# Patient Record
Sex: FEMALE | Race: BLACK OR AFRICAN AMERICAN | Employment: UNEMPLOYED | URBAN - METROPOLITAN AREA
[De-identification: names, ages, dates, MRNs, and addresses within clinical notes are randomized per-mention and may not be internally consistent; named-entity substitution may affect disease eponyms.]

---

## 2017-02-15 ENCOUNTER — HOSPITAL ENCOUNTER (EMERGENCY)
Age: 42
Discharge: HOME | End: 2017-02-15
Attending: EMERGENCY MEDICINE
Payer: MEDICAID

## 2017-02-15 VITALS
TEMPERATURE: 98.2 F | SYSTOLIC BLOOD PRESSURE: 127 MMHG | RESPIRATION RATE: 16 BRPM | HEIGHT: 61 IN | DIASTOLIC BLOOD PRESSURE: 78 MMHG | BODY MASS INDEX: 50.98 KG/M2 | WEIGHT: 270 LBS | OXYGEN SATURATION: 99 % | HEART RATE: 101 BPM

## 2017-02-15 PROCEDURE — 99282 EMERGENCY DEPT VISIT SF MDM: CPT

## 2017-02-15 NOTE — ED PROVIDER NOTES
HPI Comments: 5:41 PM Kaylen Alonso is a 37 y.o. female who presents to the ED for evaluation after her finger was stuck with a used needle 1 day ago. The pt states she was cleaning her friend's 80year old diabetic grandmother's house when she  Then down and felt a particular fingerwas a needle. The needle had been used. The patient who was the needle has no history of hep C hepatitis or HIV. Patient called poison control who advised no further treatment but she became anxious last night so she came in for further evaluation. No other aggravating or alleviating factors. No other associated symptoms. Patient is a 37 y.o. female presenting with body fluid exposure. The history is provided by the patient. Body fluid exposure          Past Medical History:   Diagnosis Date    Anemia     Asthma      sarcoidosis    G6PD deficiency (HCC)     Menses, irregular     Migraine     Thyroid disease        Past Surgical History:   Procedure Laterality Date    Hx gyn       c section; btl    Hx appendectomy      Hx other surgical       lung biopsy         Family History:   Problem Relation Age of Onset    Hypertension Mother     Diabetes Mother     Cancer Father     Hypertension Father     Diabetes Father        Social History     Social History    Marital status: SINGLE     Spouse name: N/A    Number of children: N/A    Years of education: N/A     Occupational History    Not on file.      Social History Main Topics    Smoking status: Current Every Day Smoker     Packs/day: 0.25     Years: 20.00    Smokeless tobacco: Former User    Alcohol use 0.6 oz/week     1 Shots of liquor per week      Comment: occasional/social    Drug use: No    Sexual activity: Not on file     Other Topics Concern    Not on file     Social History Narrative         ALLERGIES: Ciprofloxacin; Haldol [haloperidol lactate]; and Percocet [oxycodone-acetaminophen]    Review of Systems   All other systems reviewed and are negative. Vitals:    02/15/17 1517   BP: 127/78   Pulse: (!) 101   Resp: 16   Temp: 98.2 °F (36.8 °C)   SpO2: 99%   Weight: 122.5 kg (270 lb)   Height: 5' 1\" (1.549 m)            Physical Exam   Constitutional: She is oriented to person, place, and time. She appears well-developed. HENT:   Head: Normocephalic and atraumatic. Eyes: EOM are normal. Pupils are equal, round, and reactive to light. Neck: Normal range of motion. Neck supple. Cardiovascular: Normal rate, regular rhythm and normal heart sounds. Exam reveals no friction rub. No murmur heard. Pulmonary/Chest: Effort normal and breath sounds normal. No respiratory distress. She has no wheezes. Abdominal: Soft. She exhibits no distension. There is no tenderness. There is no rebound and no guarding. Musculoskeletal: Normal range of motion. Neurological: She is alert and oriented to person, place, and time. Skin: Skin is warm and dry. Psychiatric: She has a normal mood and affect. Her behavior is normal. Thought content normal.        MDM  Number of Diagnoses or Management Options  Diagnosis management comments:  Low if not no risk needle stick. Patient is known.,  Advised just to confirm she has no history of hepatitis or HIV. This started been confirmed by the patient at the time of the stick. ED Course       Procedures    Scribe Attestation:   SERA LOUIS am scribing for and in the presence of Que Moreira MD on this day 02/15/17 at 5:41 PM   jozef Coley    Signed by: Jozef Coley. 5:41 PM    Provider Attestation:  I personally performed the services described in the documentation, reviewed the documentation, as recorded by the scribe in my presence, and it accurately and completely records my words and actions.   Que Moreira MD. 5:41 PM

## 2017-10-04 ENCOUNTER — HOSPITAL ENCOUNTER (OUTPATIENT)
Dept: MAMMOGRAPHY | Age: 42
Discharge: HOME | End: 2017-10-04
Attending: OBSTETRICS & GYNECOLOGY
Payer: MEDICAID

## 2017-10-04 DIAGNOSIS — Z12.31 VISIT FOR SCREENING MAMMOGRAM: ICD-10-CM

## 2017-10-04 PROCEDURE — 77067 SCR MAMMO BI INCL CAD: CPT

## 2018-10-17 ENCOUNTER — HOSPITAL ENCOUNTER (OUTPATIENT)
Dept: MAMMOGRAPHY | Age: 43
Discharge: HOME | End: 2018-10-17
Attending: FAMILY MEDICINE
Payer: MEDICAID

## 2018-10-17 DIAGNOSIS — Z12.31 VISIT FOR SCREENING MAMMOGRAM: ICD-10-CM

## 2018-10-17 PROCEDURE — 77067 SCR MAMMO BI INCL CAD: CPT

## 2019-11-05 ENCOUNTER — HOSPITAL ENCOUNTER (OUTPATIENT)
Dept: MAMMOGRAPHY | Age: 44
Discharge: HOME | End: 2019-11-05
Attending: FAMILY MEDICINE
Payer: MEDICAID

## 2019-11-05 DIAGNOSIS — Z12.31 VISIT FOR SCREENING MAMMOGRAM: ICD-10-CM

## 2019-11-05 PROCEDURE — 77063 BREAST TOMOSYNTHESIS BI: CPT

## 2020-06-19 ENCOUNTER — HOSPITAL ENCOUNTER (OUTPATIENT)
Dept: PHYSICAL THERAPY | Age: 45
Discharge: HOME | End: 2020-06-19
Payer: MEDICAID

## 2020-06-19 PROCEDURE — 97161 PT EVAL LOW COMPLEX 20 MIN: CPT

## 2020-06-19 NOTE — PROGRESS NOTES
PT DAILY TREATMENT NOTE 10-18    Patient Name: Jennifer Heath  Date:2020  : 1975  [x]  Patient  Verified  Payor: BLUE CROSS MEDICAID / Plan: [facility] / Product Type: Managed Care Medicaid /    In time:11:00  Out time:11:40  Total Treatment Time (min): 40  Visit #: 1 of 10    Medicare/BCBS Only   Total Timed Codes (min):  15 1:1 Treatment Time:  40       Treatment Area: MCL sprain of right knee [S83.411A]    SUBJECTIVE  Pain Level (0-10 scale): 7  Any medication changes, allergies to medications, adverse drug reactions, diagnosis change, or new procedure performed?: [x] No    [] Yes (see summary sheet for update)  Subjective functional status/changes:   [] No changes reported  See POC    OBJECTIVE    30 min [x]Eval                  []Re-Eval       5 min Therapeutic Exercise:  [] See flow sheet :   Rationale: increase ROM and increase strength to improve the patients ability to perform gait with improved toe clearance. 10 min Therapeutic Activity:  []  See flow sheet : Patient education on therapy assessment, prognosis, expectations for therapy sessions, patient goals, advice on restricting weight bearing during stair negotiation, and HEP. Rationale: to improve the patients ability to adhere to HEP and therapy sessions for increased compliance when working toward therapy goals.           With   [] TE   [x] TA   [] neuro   [] other: Patient Education: [x] Review HEP    [] Progressed/Changed HEP based on:   [] positioning   [] body mechanics   [] transfers   [] heat/ice application    [] other:      Other Objective/Functional Measures: See POC     Pain Level (0-10 scale) post treatment: 7    ASSESSMENT/Changes in Function: See POC    Patient will continue to benefit from skilled PT services to modify and progress therapeutic interventions, address functional mobility deficits, address ROM deficits, address strength deficits, analyze and address soft tissue restrictions, analyze and cue movement patterns, analyze and modify body mechanics/ergonomics, assess and modify postural abnormalities, address imbalance/dizziness and instruct in home and community integration to attain remaining goals.      [x]  See Plan of Care  []  See progress note/recertification  []  See Discharge Summary         Progress towards goals / Updated goals:  See POC    PLAN  [x]  Upgrade activities as tolerated     []  Continue plan of care  [x]  Update interventions per flow sheet       []  Discharge due to:_  []  Other:_      Tereza Quesada 6/19/2020  10:51 AM    Future Appointments   Date Time Provider Kathie Hoclomb   6/19/2020 11:00 AM Oni Gonzalez [facility]
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
## 2020-07-06 ENCOUNTER — HOSPITAL ENCOUNTER (OUTPATIENT)
Dept: PHYSICAL THERAPY | Age: 45
Discharge: HOME | End: 2020-07-06
Payer: MEDICAID

## 2020-07-06 PROCEDURE — 97110 THERAPEUTIC EXERCISES: CPT

## 2020-07-06 PROCEDURE — 97014 ELECTRIC STIMULATION THERAPY: CPT

## 2020-07-06 PROCEDURE — 97530 THERAPEUTIC ACTIVITIES: CPT

## 2020-07-06 NOTE — PROGRESS NOTES
PT DAILY TREATMENT NOTE 10-18    Patient Name: Radha Zapata  Date:2020  : 1975  [x]  Patient  Verified  Payor: BLUE CROSS MEDICAID / Plan: [facility] [employer] [facility] [employer] / Product Type: Managed Care Medicaid /    In time:2:50  Out time:3:43  Total Treatment Time (min): 48  Visit #: 2 of 10    Medicare/BCBS Only   Total Timed Codes (min):  38 1:1 Treatment Time:  38       Treatment Area: Right knee pain [M25.561]  Sprain of medial collateral ligament of right knee, initial encounter [S83.411A]    SUBJECTIVE  Pain Level (0-10 scale): 4  Any medication changes, allergies to medications, adverse drug reactions, diagnosis change, or new procedure performed?: [x] No    [] Yes (see summary sheet for update)  Subjective functional status/changes:   [] No changes reported  Pt reports her pain is not too bad today but she continues to have pain with stairs and instability at times.     OBJECTIVE    Modality rationale: decrease edema, decrease inflammation and decrease pain to improve the patients ability to tolerate ADLs   Min Type Additional Details   15 (includes 5 min set up) [x] Estim:  [x]Unatt       []IFC  [x]Premod                        []Other:  []w/ice   [x]w/heat  Position: reclined  Location: anterior/medial and anterior/lateral right knee     [] Estim: []Att    []TENS instruct  []NMES                    []Other:  []w/US   []w/ice   []w/heat  Position:  Location:    []  Traction: [] Cervical       []Lumbar                       [] Prone          []Supine                       []Intermittent   []Continuous Lbs:  [] before manual  [] after manual    []  Ultrasound: []Continuous   [] Pulsed                           []1MHz   []3MHz W/cm2:  Location:    []  Iontophoresis with dexamethasone         Location: [] Take home patch   [] In clinic    []  Ice     []  heat  []  Ice massage  []  Laser   []  Anodyne Position:  Location:    []  Laser with stim  []  Other:  Position:  Location:    [] Vasopneumatic Device Pressure:       [] lo [] med [] hi   Temperature: [] lo [] med [] hi   [] Skin assessment post-treatment:  []intact []redness- no adverse reaction    []redness  adverse reaction:     28 min Therapeutic Exercise:  [x] See flow sheet :   Rationale: increase ROM and increase strength to improve the patients ability to tolerate ADLs    10 min Therapeutic Activity:  [x]  See flow sheet : sit to stand and balancing exercises to improve ease of transfers and balance with household mobility    Rationale: increase ROM and increase strength  to improve the patients ability to transfers and mobility. With   [] TE   [] TA   [] neuro   [] other: Patient Education: [x] Review HEP    [] Progressed/Changed HEP based on:   [] positioning   [] body mechanics   [] transfers   [] heat/ice application    [] other:      Other Objective/Functional Measures: Initiated exercises/interventions in flow sheet. Increased Q angle noted on the right LE in supine and sitting. No UE support used for sit to stands. Unable to flex her right knee without externally rotating her right hip secondary to knee pain. Needed UE support with MSR balance exercise today. Pain Level (0-10 scale) post treatment: 3    ASSESSMENT/Changes in Function: Reported improvement in pain post session today after premod and CP. Pt reported increased pain in the right hip/knee with standing exercises and was unable to perform MSR with the right LE behind without bending her right knee. Pt was limited today by pain in the right knee and hip. We will plan on continuing therapy to improve pain control and improve mobility. Continue POC as tolerated.      Patient will continue to benefit from skilled PT services to modify and progress therapeutic interventions, address functional mobility deficits, address ROM deficits, address strength deficits, analyze and address soft tissue restrictions, analyze and cue movement patterns, analyze and modify body mechanics/ergonomics, assess and modify postural abnormalities, address imbalance/dizziness and instruct in home and community integration to attain remaining goals. []  See Plan of Care  []  See progress note/recertification  []  See Discharge Summary         Progress towards goals / Updated goals:  Short Term Goals: To be accomplished in 1 week  - Goal: Pt to be compliant with initial HEP to improve patient's ability to independently manage symptoms. Status at last note/certification: Established and reviewed with Pt  Long Term Goals: To be accomplished in 10 treatments  - Goal: Pt to demonstrate single leg stance abilities of at least 10 seconds bilaterally to improve her ability to negotiate obstacles in the community with decreased falls risk. Status at last note/certification: 2 seconds right, 6 seconds left  - Goal: Patient will demonstrate 0-110 degrees AROM right knee to increase ease of ADLs  Status at last note/certification: right knee 0-104 degrees AROM  - Goal: Patient will ascend and descend 24 steps with reciprocal pattern to increase ease of entry into home. Status at last note/certification: 24 steps with left LE leading and bearing all weight during stair negotiation  - Goal: Patient will perform 5x STS test in less than or equal to 12 seconds to demo improved functional LE strength. Status at last note/certification: 16 seconds  - Goal: Patient will increase FOTO score to at least 64 pts to demonstrate increased functional mobility.   Status at last note/certification: FOTO 52 pts     PLAN  [x]  Upgrade activities as tolerated     [x]  Continue plan of care  [x]  Update interventions per flow sheet       []  Discharge due to:_  []  Other:_      Nany Warren, PT 7/6/2020  3:46 PM    Future Appointments   Date Time Provider Kathie Holcomb   7/9/2020  2:45 PM Marek Thakkar [facility]   7/15/2020  2:45 PM Irish Abernathy, PT [id number]   7/17/2020  2:45 PM Erica White R [id number]   7/20/2020  2:45 PM Lelan Flight, PT [id number] [facility]   7/23/2020  2:45 PM Lelan Flight, PT [id number] [facility]   7/27/2020  2:45 PM Lelan Flight, PT [id number] [facility]   7/30/2020  2:45 PM Skeet Sonia Salomon, PT [id number]

## 2020-07-09 ENCOUNTER — HOSPITAL ENCOUNTER (OUTPATIENT)
Dept: PHYSICAL THERAPY | Age: 45
Discharge: HOME | End: 2020-07-09
Payer: MEDICAID

## 2020-07-09 PROCEDURE — 97110 THERAPEUTIC EXERCISES: CPT

## 2020-07-09 PROCEDURE — 97014 ELECTRIC STIMULATION THERAPY: CPT

## 2020-07-09 PROCEDURE — 97530 THERAPEUTIC ACTIVITIES: CPT

## 2020-07-09 NOTE — PROGRESS NOTES
PT DAILY TREATMENT NOTE 10-18    Patient Name: Zeenat Parry  Date:2020  : 1975  [x]  Patient  Verified  Payor: BLUE CROSS MEDICAID / Plan: [facility] [employer] [facility] / Product Type: Managed Care Medicaid /    In time: 2:54   Out time: 3:47  Total Treatment Time (min): 48  Visit #: 3 of 10    Medicare/BCBS Only   Total Timed Codes (min): 38 1:1 Treatment Time: 38       Treatment Area: Right knee pain [M25.561]  Sprain of medial collateral ligament of right knee, initial encounter [S83.411A]    SUBJECTIVE  Pain Level (0-10 scale): 5 quad/hip  Any medication changes, allergies to medications, adverse drug reactions, diagnosis change, or new procedure performed?: [x] No    [] Yes (see summary sheet for update)  Subjective functional status/changes:   [] No changes reported  Pt reports having some increased pain in the right hip and quad region today.      OBJECTIVE    Modality rationale: decrease edema, decrease inflammation and decrease pain to improve the patients ability to tolerate ADLs   Min Type Additional Details   15 (includes 5 min set up) [x] Estim:  [x]Unatt       [x]IFC  []Premod                        []Other:  []w/ice   [x]w/heat  Position: reclined  Location: anterior right knee     [] Estim: []Att    []TENS instruct  []NMES                    []Other:  []w/US   []w/ice   []w/heat  Position:  Location:    []  Traction: [] Cervical       []Lumbar                       [] Prone          []Supine                       []Intermittent   []Continuous Lbs:  [] before manual  [] after manual    []  Ultrasound: []Continuous   [] Pulsed                           []1MHz   []3MHz W/cm2:  Location:    []  Iontophoresis with dexamethasone         Location: [] Take home patch   [] In clinic    []  Ice     []  heat  []  Ice massage  []  Laser   []  Anodyne Position:  Location:    []  Laser with stim  []  Other:  Position:  Location:    []  Vasopneumatic Device Pressure:       [] lo [] med [] hi   Temperature: [] lo [] med [] hi   [] Skin assessment post-treatment:  []intact []redness- no adverse reaction    []redness  adverse reaction:     30 min Therapeutic Exercise:  [x] See flow sheet :   Rationale: increase ROM and increase strength to improve the patients ability to tolerate ADLs    8 min Therapeutic Activity:  [x]  See flow sheet : balancing exercises to improve stability in the LEs for ambulation. Rationale: increase ROM and increase strength  to improve the patients ability to transfers and mobility. With   [] TE   [] TA   [] neuro   [] other: Patient Education: [x] Review HEP    [] Progressed/Changed HEP based on:   [] positioning   [] body mechanics   [] transfers   [] heat/ice application    [] other:      Other Objective/Functional Measures: Attempted IFC to the right knee with CP to improve pain/swelling. Added exercises per flow sheet to improve pt's subjective comments of right hip and quad pain. Pain Level (0-10 scale) post treatment: 0 in knee    ASSESSMENT/Changes in Function: Reported improvement in the knee post session today. Pt reported improvement in her right hip and quad regions with hip EXT exercises and hip flex stretch. Cannot rule out possible anterior hip impingement secondary to her left hip/quad symptoms today. Continue POC as tolerated. Patient will continue to benefit from skilled PT services to modify and progress therapeutic interventions, address functional mobility deficits, address ROM deficits, address strength deficits, analyze and address soft tissue restrictions, analyze and cue movement patterns, analyze and modify body mechanics/ergonomics, assess and modify postural abnormalities, address imbalance/dizziness and instruct in home and community integration to attain remaining goals.      []  See Plan of Care  []  See progress note/recertification  []  See Discharge Summary         Progress towards goals / Updated goals:  Short Term Goals: To be accomplished in 1 week  - Goal: Pt to be compliant with initial HEP to improve patient's ability to independently manage symptoms. Status at last note/certification: Established and reviewed with Pt  Long Term Goals: To be accomplished in 10 treatments  - Goal: Pt to demonstrate single leg stance abilities of at least 10 seconds bilaterally to improve her ability to negotiate obstacles in the community with decreased falls risk. Status at last note/certification: 2 seconds right, 6 seconds left   Current: limited stance time noted on the right LE with gait today secondary to pain 7/9/2020  - Goal: Patient will demonstrate 0-110 degrees AROM right knee to increase ease of ADLs  Status at last note/certification: right knee 0-104 degrees AROM  - Goal: Patient will ascend and descend 24 steps with reciprocal pattern to increase ease of entry into home. Status at last note/certification: 24 steps with left LE leading and bearing all weight during stair negotiation  - Goal: Patient will perform 5x STS test in less than or equal to 12 seconds to demo improved functional LE strength. Status at last note/certification: 16 seconds  - Goal: Patient will increase FOTO score to at least 64 pts to demonstrate increased functional mobility.   Status at last note/certification: FOTO 52 pts     PLAN  [x]  Upgrade activities as tolerated     [x]  Continue plan of care  [x]  Update interventions per flow sheet       []  Discharge due to:_  []  Other:_      Kia Kelley PT 7/9/2020  2:58 PM    Future Appointments   Date Time Provider Kathie Holcomb   7/15/2020  2:45 PM Anish Espana PT [facility]   7/17/2020  2:45 PM Dony Mullen [facility]   7/20/2020  2:45 PM Fariha Net, PTA [facility]   7/23/2020  2:45 PM Fariha Net, PTA [facility]   7/27/2020  2:45 PM Fariha Net, PTA [facility]   7/30/2020  2:45 PM Fariha Net, PTA [facility]

## 2020-07-15 ENCOUNTER — APPOINTMENT (OUTPATIENT)
Dept: PHYSICAL THERAPY | Age: 45
End: 2020-07-15
Payer: MEDICAID

## 2020-07-27 ENCOUNTER — APPOINTMENT (OUTPATIENT)
Dept: PHYSICAL THERAPY | Age: 45
End: 2020-07-27
Payer: MEDICAID

## 2020-07-30 ENCOUNTER — APPOINTMENT (OUTPATIENT)
Dept: PHYSICAL THERAPY | Age: 45
End: 2020-07-30
Payer: MEDICAID

## 2020-08-21 NOTE — PROGRESS NOTES
[facility] [place] [facility]  [address] [facility]  [phone] fax    Physical Therapy Discharge Summary    Patient name: Peace Arzate Start of Care: 2020   Referral source: Shivani Che, * : 1975                Medical Diagnosis: MCL sprain of right knee [S83.411A]  Payor: BLUE CROSS MEDICAID / Plan: [address] [place] [address] / Product Type: Managed Care Medicaid /  Onset Date: 2020                Treatment Diagnosis: right knee pain   Prior Hospitalization: see medical history Provider#: [id number]   Medications: Verified on Patient summary List    Comorbidities: sarcoidosis, depression, asthma, thyroid issues      Prior Level of Function: functionally independent, lives in 3rd floor apartment, currently working at [employer], [employer]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 Status at last note/certification: 2 seconds right, 6 seconds left  Status at discharge: not met    Goal: Goal: Patient will demonstrate 0-110 degrees AROM right knee to increase ease of ADLs  Status at last note/certification: right knee 0-104 degrees AROM  Status at last note/certification:  Status at discharge: not met    Goal: Goal: Patient will ascend and descend 24 steps with reciprocal pattern to increase ease of entry into home. Status at last note/certification: 24 steps with left LE leading and bearing all weight during stair negotiation  Status at last note/certification:  Status at discharge: not met    Goal: Patient will perform 5x STS test in less than or equal to 12 seconds to demo improved functional LE strength.   Status at last note/certification: 16 seconds  Status at discharge: not met    Goal: Patient will increase FOTO score to at least 64 pts to demonstrate increased functional mobility. Status at last note/certification: FOTO 52 pts   Status at discharge: not met    Pt. Was seen for 3 visits and then did not return to PT. Goals were unable to be re-assessed secondary to unplanned D/C.        ASSESSMENT/RECOMMENDATIONS:  [x]Discontinue therapy: []Patient has reached or is progressing toward set goals      [x]Patient is non-compliant or has abdicated      []Due to lack of appreciable progress towards set goals    Gabriel Saleh, PT 8/21/2020 9:18 AM

## 2020-11-30 ENCOUNTER — APPOINTMENT (OUTPATIENT)
Dept: PHYSICAL THERAPY | Age: 45
End: 2020-11-30

## 2021-09-17 ENCOUNTER — TRANSCRIBE ORDER (OUTPATIENT)
Dept: SCHEDULING | Age: 46
End: 2021-09-17

## 2021-09-17 DIAGNOSIS — Z12.31 VISIT FOR SCREENING MAMMOGRAM: Primary | ICD-10-CM

## 2021-10-19 ENCOUNTER — HOSPITAL ENCOUNTER (OUTPATIENT)
Dept: MAMMOGRAPHY | Age: 46
Discharge: HOME | End: 2021-10-19
Attending: GENERAL ACUTE CARE HOSPITAL
Payer: MEDICAID

## 2021-10-19 DIAGNOSIS — Z12.31 VISIT FOR SCREENING MAMMOGRAM: ICD-10-CM

## 2021-10-19 PROCEDURE — 77063 BREAST TOMOSYNTHESIS BI: CPT

## 2022-04-20 ENCOUNTER — OFFICE VISIT (OUTPATIENT)
Dept: ORTHOPEDIC SURGERY | Age: 47
End: 2022-04-20
Payer: MEDICAID

## 2022-04-20 VITALS
OXYGEN SATURATION: 97 % | BODY MASS INDEX: 54.75 KG/M2 | RESPIRATION RATE: 16 BRPM | HEIGHT: 61 IN | HEART RATE: 87 BPM | TEMPERATURE: 97.7 F | WEIGHT: 290 LBS

## 2022-04-20 DIAGNOSIS — G89.29 CHRONIC PAIN OF LEFT KNEE: Primary | ICD-10-CM

## 2022-04-20 DIAGNOSIS — M25.562 CHRONIC PAIN OF LEFT KNEE: Primary | ICD-10-CM

## 2022-04-20 DIAGNOSIS — M12.562 TRAUMATIC ARTHRITIS OF LEFT KNEE: ICD-10-CM

## 2022-04-20 DIAGNOSIS — S82.142S CLOSED FRACTURE OF LEFT TIBIAL PLATEAU, SEQUELA: ICD-10-CM

## 2022-04-20 DIAGNOSIS — M17.12 ARTHRITIS OF KNEE, LEFT: ICD-10-CM

## 2022-04-20 DIAGNOSIS — M25.662 DECREASED RANGE OF MOTION (ROM) OF LEFT KNEE: ICD-10-CM

## 2022-04-20 DIAGNOSIS — M21.162 GENU VARUM OF LEFT LOWER EXTREMITY: ICD-10-CM

## 2022-04-20 PROCEDURE — 99204 OFFICE O/P NEW MOD 45 MIN: CPT | Performed by: PHYSICIAN ASSISTANT

## 2022-04-20 PROCEDURE — 73562 X-RAY EXAM OF KNEE 3: CPT | Performed by: PHYSICIAN ASSISTANT

## 2022-04-20 RX ORDER — DICLOFENAC SODIUM 75 MG/1
75 TABLET, DELAYED RELEASE ORAL 2 TIMES DAILY
Qty: 60 TABLET | Refills: 2 | Status: SHIPPED | OUTPATIENT
Start: 2022-04-20

## 2022-04-20 NOTE — PROGRESS NOTES
Patient: Irena Sandy                MRN: [id number]       SSN: [id number]  YOB: 1975        AGE: 40 y.o. SEX: female          PCP: [facility]  04/20/22    Chief Complaint   Patient presents with    Knee Pain     left knee pain       HISTORY:  Irena Sandy is a 40 y.o. female presents to the office with a complaint of acute on chronic left knee pain. Pain is primarily over the medial aspect of the knee. Is been present for several years and patient relates a history of an injury when she was play fighting with her family member and the family member landed on her left knee as they fell to the floor. She had immediate pain thereafter but did not seek care. She was seen within the past through [facility] in [place] and x-ray of the left knee revealed tricompartmental osteoarthritis greatest in the medial joint space. There was no yoly at that time about a prior fracture identified that had healed or was still present. Patient is very limited in her abilities to climb and is in stairs secondary to severe left knee pain as well as limited to only walking short distances. When she is rises from a sitting position or sits from a standing position she has a great deal of left medial knee pain. Previously through [facility] she was provided a prescription for Voltaren enteric-coated tablet 75 mg with no refills and she initially was told to take the medication twice daily but instead only took it once daily. She had no significant relief from using the medicine daily.       Pain Assessment  4/20/2022   Location of Pain Knee   Location Modifiers Left   Severity of Pain 6   Quality of Pain Aching;Dull   Duration of Pain Persistent   Frequency of Pain Constant   Aggravating Factors Stairs;Standing   Limiting Behavior Yes   Result of Injury No           No results found for: HBA1C, QBP8HOAT, TNU9ZGKZ  Weight Metrics 4/20/2022 1/7/2022 12/27/2021 2/15/2017 12/28/2016 12/27/2016 10/8/2016   Weight 290 lb - 275 lb 270 lb - 270 lb -   BMI 54.8 kg/m2 51.96 kg/m2 - 51.02 kg/m2 51.02 kg/m2 - 51.02 kg/m2            Problem List Items Addressed This Visit     None      Visit Diagnoses     Chronic pain of left knee    -  Primary    Relevant Orders    AMB POC X-RAY KNEE 3 VIEW    Arthritis of knee, left        Decreased range of motion (ROM) of left knee        Adult BMI 50.0-59.9 kg/sq m (HCC)        Genu varum of left lower extremity        Traumatic arthritis of left knee        Closed fracture of left tibial plateau, sequela              PAST MEDICAL HISTORY:   Past Medical History:   Diagnosis Date    Anemia     Arthritis     Asthma     sarcoidosis    G6PD deficiency     GERD (gastroesophageal reflux disease)     History of heart murmur in childhood     Menses, irregular     Migraine     Thyroid disease        PAST SURGICAL HISTORY:   Past Surgical History:   Procedure Laterality Date    HX APPENDECTOMY      HX GYN      c section; btl    HX OTHER SURGICAL      lung biopsy    HX OTHER SURGICAL Right 10/2020    arm       ALLERGIES:   Allergies   Allergen Reactions    Ciprofloxacin Anaphylaxis    Haldol [Haloperidol Lactate] Anaphylaxis    Percocet [Oxycodone-Acetaminophen] Anaphylaxis        CURRENT MEDICATIONS:  A list of medications prior to the time of admission include:  Prior to Admission medications    Medication Sig Start Date End Date Taking? Authorizing Provider   diclofenac EC (VOLTAREN) 75 mg EC tablet Take 1 Tablet by mouth two (2) times a day. 4/20/22  Yes Mohsen Rogers PA-C   levothyroxine (SYNTHROID) 150 mcg tablet Take 150 mcg by mouth Daily (before breakfast). Yes Provider, Historical   liothyronine (CYTOMEL) 25 mcg tablet Take 25 mcg by mouth daily. Yes Provider, Historical   zinc sulfate (ZINC-15 PO) Take  by mouth.    Yes Provider, Historical   b complex vitamins tablet Take 1 Tablet by mouth daily. Yes Provider, Historical   multivitamin (ONE A DAY) tablet Take 1 Tablet by mouth daily. Yes Provider, Historical   ascorbic acid, vitamin C, (Vitamin C) 500 mg tablet Take 500 mg by mouth. Yes Provider, Historical   cetirizine (ZYRTEC) 10 mg tablet Take 10 mg by mouth daily. Yes Provider, Historical   aspirin delayed-release 81 mg tablet Take 81 mg by mouth daily. Yes Other, MD Charleen   cholecalciferol (VITAMIN D3) 1,000 unit cap Take 1,000 Units by mouth daily. Yes Provider, Historical   albuterol (PROVENTIL HFA, VENTOLIN HFA, PROAIR HFA) 90 mcg/actuation inhaler Take 2 Puffs by inhalation as needed for Wheezing. Indications: sarcoidosis   Yes Provider, Historical   pantoprazole (PROTONIX) 40 mg tablet Take 40 mg by mouth daily. Patient not taking: Reported on 4/20/2022    Provider, Historical   diclofenac EC (VOLTAREN) 75 mg EC tablet Take 75 mg by mouth daily as needed for Pain. Patient not taking: Reported on 4/20/2022    Provider, Historical       FAMILY HISTORY:   Family History   Problem Relation Age of Onset    Hypertension Mother     Diabetes Mother     Cancer Father     Hypertension Father     Diabetes Father     Breast Cancer Maternal Grandmother        SOCIAL HISTORY:   Social History     Socioeconomic History    Marital status: SINGLE   Tobacco Use    Smoking status: Current Every Day Smoker     Packs/day: 0.25     Years: 20.00     Pack years: 5.00    Smokeless tobacco: Never Used   Vaping Use    Vaping Use: Never used   Substance and Sexual Activity    Alcohol use: Yes     Alcohol/week: 1.0 standard drink     Types: 1 Shots of liquor per week     Comment: occasional/social    Drug use: Not Currently     Types: Marijuana     Comment: in early adulthood       ROS:No CP, No SOB, No fever/chills nor night sweats. No headaches, vision abnormalities to include double and or loss of vision. No dizziness. No hearing abnormalities. No Chest Pain nor Shortness of breath.   Pt denies h/o spinal surgery, injections, or PT/chiropractor. Patient has attempted self treatment with less than adequate relief on oral and topical analgesic / anti inflammatory medications . Pt denies change in bowel or bladder habits. No saddle paresthesia / anesthesia. Pt denies fever, unplanned weight loss / weight gains, and no skin changes. Musculoskeletal pain per HPI. Pain is exacerbated positionally. PHYSICAL EXAM:    Visit Vitals  Pulse 87   Temp 97.7 °F (36.5 °C) (Temporal)   Resp 16   Ht 5' 1\" (1.549 m)   Wt 290 lb (131.5 kg)   SpO2 97%   BMI 54.80 kg/m²       Constitutional: Appears well-developed and well-nourished. No distress. Sitting comfortably in the exam room, interacting with conversation with pleasant affect. Gait appears steady and patient exhibits no evidence of ataxia. Patient is able to ambulate with caution. No focal neurological deficit noted. No facial droop, slurred speech, or evidence of altered mentation noted on exam.   Skin: Skin over the head, neck, bilateral limbs, and trunk is warm and dry. No rash or erythema noted. Cranial Nerves II-XII grossly intact  HENT: NC/AT. Normal symmetry, bulk and tone of facial and neck musculature. Trachea midline. No discernible thyromegaly or masses. No involuntary movements. Lymphatic: No preauricular, submandibuar, anterior or posterior cervical lymphadenopathy. Psychiatric: The patient is awake, alert, and oriented to person, place and time. Behavior is normal. Thought content normal.   Cardiovascular: No clubbing, cyanosis. No edema bilateral lower extremities. Pulmonary: No tripoding nor accessory muscle recruitment. Breathing normally, no distress, no audible wheezing. Distal cap refill intact at 2/2 Mir UE / LE. Neuro intact Mir UE/LE to noxious stimuli        Ortho Specific exam:    Left knee reveals skin intact. No warmth, erythema, edema, or ecchymosis. There is a trace effusion noted.     With patient supine she has poor range of motion secondary to proximal posterior soft tissue bulk in the flexion plane noted to barely 70 degrees and lacking 10 degrees to full extension. There is significant medial joint line pain in the extension plane. She has a varus deformity noted in extension. Patella tracks midline with crepitation. Quad and patellar tendons intact. MCL and LCL intact with no laxity. ACL and PCL intact with no laxity. Positive Winston's sign    X-ray: [facility] 4/20/2022 space 3 view of the left knee reveals tricompartmental osteoarthritis with what appears to be a healed medial tibial plateau fracture. There is patellofemoral arthritis noted and moderate medial joint space narrowing. There is a moderate collapse of the medial joint space of the left knee when compared to the right knee and AP and tunnel imaging. No lytic or blastic lesions. No soft tissue ossifications. IMPRESSION:      ICD-10-CM ICD-9-CM    1. Chronic pain of left knee  M25.562 719.46 AMB POC X-RAY KNEE 3 VIEW    G89.29 338.29    2. Arthritis of knee, left  M17.12 716.96    3. Decreased range of motion (ROM) of left knee  M25.662 719.56    4. Adult BMI 50.0-59.9 kg/sq m ([address]  Z68.43 V85.43    5. Genu varum of left lower extremity  M21.162 736.42    6. Traumatic arthritis of left knee  M12.562 716.16    7. Closed fracture of left tibial plateau, sequela  Q69.265R 905.4         PLAN: Today we discussed alternatives care to include but not limited to a retrial of Voltaren enteric-coated tablet 75 mg p.o. twice daily. New prescription was sent. With her Winston's sign positive and history of trauma to the left knee with that medial tibial plateau subsidence as compared to the right I am good order an MRI to further evaluate for soft tissue internal derangement. Patient to follow once the MRI is available for review and comment.   She was also provided a letter today indicating that she should seek first floor living arrangements noting that she currently lives on the third floor. X-rays reviewed today copies provided all of her questions answered to her satisfaction. Additionally today we discussed the diagnosis of obesity and the importance of weight management for both cardiovascular health. The patient was recommended to decrease carbohydrate and sugar intake. Patient recommended a formal dietary consult which they will consider and return a call to our office. In light of the patient's osteoarthritic findings I am making a recommendation for aerobic exercise to include but not limited to stationary bicycle, elliptical, therapeutic walking with good shoes and or swimming. Patient should avoid any running or jumping. If using the treadmill then recommendation for no elevation and no running or jogging. Care plan outlined and precautions discussed. Results were reviewed with the patient. All medications were reviewed with the patient. All of pt's questions and concerns were addressed. Alarm symptoms and return precautions associated with chief complaint and evaluation were reviewed with the patient in detail. The patient demonstrated adequate understanding. The patient expresses willing compliance with the treatment plan. No Narcotic indicated today. Patient given pain medication for short term acute pain relief. Goal is to treat patient according to above plan and to ultimately have patient off all pain medications once appropriate. If chronic pain management is required beyond what is expected for current orthopedic problem, will refer patient to pain management.  was reviewed and will be reviewed with every medication refill request.         Patient provided a reminder for a \"due or due soon\" health maintenance. I have asked the patient to schedule an appointment with their primary care provider for follow-up on general health maintenance concerns.     Today all the patient's questions were answered to their satisfaction. Copies of x-rays reviewed if obtained this visit, and provided to patient. Dictation disclaimer:  Please note that this dictation was completed with [employer], the computer voice recognition software. Quite often unanticipated grammatical, syntax, homophones, and other interpretive errors are inadvertently transcribed by the computer software. Please disregard these errors. Please excuse any errors that have escaped final proofreading. Isaak CAMARA, APC, MPAS, PA-C  [facility]

## 2022-04-20 NOTE — LETTER
4/20/2022 10:36 AM    Ms. Sherri Estrada  [address] [address]    To whom it may concern: The above patient is currently under my care for a severe orthopedic concern. Due to her limitations brought upon by the severe orthopedic condition she is recommended first floor living only.               Sincerely,      Glenn Mejias PA-C

## 2022-06-21 ENCOUNTER — OFFICE VISIT (OUTPATIENT)
Dept: ORTHOPEDIC SURGERY | Age: 47
End: 2022-06-21
Payer: MEDICAID

## 2022-06-21 VITALS
BODY MASS INDEX: 54.75 KG/M2 | HEART RATE: 110 BPM | TEMPERATURE: 96.8 F | HEIGHT: 61 IN | WEIGHT: 290 LBS | OXYGEN SATURATION: 91 %

## 2022-06-21 DIAGNOSIS — M25.562 ACUTE PAIN OF LEFT KNEE: Primary | ICD-10-CM
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
## 2022-06-21 DIAGNOSIS — S80.12XA CONTUSION OF LEFT LOWER LEG, INITIAL ENCOUNTER: ICD-10-CM

## 2022-06-21 DIAGNOSIS — M25.562 ACUTE PAIN OF LEFT KNEE: ICD-10-CM

## 2022-06-21 DIAGNOSIS — M25.662 DECREASED RANGE OF MOTION (ROM) OF LEFT KNEE: ICD-10-CM

## 2022-06-21 DIAGNOSIS — M17.12 ARTHRITIS OF LEFT KNEE: ICD-10-CM

## 2022-06-21 PROCEDURE — 73562 X-RAY EXAM OF KNEE 3: CPT | Performed by: PHYSICIAN ASSISTANT

## 2022-06-21 PROCEDURE — 99213 OFFICE O/P EST LOW 20 MIN: CPT | Performed by: PHYSICIAN ASSISTANT

## 2022-06-21 PROCEDURE — 73590 X-RAY EXAM OF LOWER LEG: CPT | Performed by: PHYSICIAN ASSISTANT

## 2022-06-21 NOTE — LETTER
6/21/2022 3:34 PM    Ms. Abeba Boyd  [address] [facility]  [address]    To whom it may concern:    Above patient is currently being treated for an orthopedic condition. For orthopedic she is recommended no stretcher or wheelchair patient's transportation/care. Recommend ambulatory patient's only. Duration for above recommendations 30 days.         Sincerely,      Milo Jose PA-C

## 2022-06-21 NOTE — PROGRESS NOTES
Patient: Barb Mckeon                MRN: [id number]       SSN: [id number]  YOB: 1975        AGE: 40 y.o. SEX: female          PCP: [facility]  06/21/22 6/21/2022: Patient returns the office for follow-up regarding her left knee. She was seen in April 2022 at which time she was found to have suspicion for internal derangement of the left knee. An MRI was ordered. Unfortunately the MRI was not performed. 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 There was no loss of consciousness at the time of the fall. Patient's pain primarily since the fall has been over the inner portion of her left knee which is consistent with the area previously identified as near bone-on-bone osteoarthritic findings. Patient has not worked since the time of the accident. At rest and with activity she reports pain to the left knee being easily a 4-5 on a 10 point scale. Occasionally she gets a surge of pain that measures 6-7 on a 10 point scale. Chief Complaint   Patient presents with    Knee Pain     lt       HISTORY:  Barb Mckeon is a 40 y.o. female presents to the office with a complaint of acute on chronic left knee pain. Pain is primarily over the medial aspect of the knee. Is been present for several years and patient relates a history of an injury when she was play fighting with her family member and the family member landed on her left knee as they fell to the floor. She had immediate pain thereafter but did not seek care. She was seen within the past through [facility] in [place] and x-ray of the left knee revealed tricompartmental osteoarthritis greatest in the medial joint space.   There was no yoly at that time about a prior fracture identified that had healed or was still present. Patient is very limited in her abilities to climb and is in stairs secondary to severe left knee pain as well as limited to only walking short distances. When she is rises from a sitting position or sits from a standing position she has a great deal of left medial knee pain. Previously through [facility] she was provided a prescription for Voltaren enteric-coated tablet 75 mg with no refills and she initially was told to take the medication twice daily but instead only took it once daily. She had no significant relief from using the medicine daily. Pain Assessment  6/21/2022   Location of Pain Knee   Location Modifiers Left   Severity of Pain 6   Quality of Pain Dull; Throbbing;Aching   Duration of Pain Persistent   Frequency of Pain Constant   Aggravating Factors Walking;Standing;Squatting;Bending   Limiting Behavior Yes   Relieving Factors NSAID   Result of Injury No           No results found for: HBA1C, RHF8EKHL, LXU0YTTH, UOE9VGMT  Weight Metrics 6/21/2022 4/20/2022 1/7/2022 12/27/2021 2/15/2017 12/28/2016 12/27/2016   Weight 290 lb 290 lb - 275 lb 270 lb - 270 lb   BMI 54.8 kg/m2 54.8 kg/m2 51.96 kg/m2 - 51.02 kg/m2 51.02 kg/m2 -            Problem List Items Addressed This Visit     None      Visit Diagnoses     Acute pain of left knee    -  Primary    Relevant Orders    AMB POC X-RAY KNEE 3 VIEW (Completed)    AMB POC XRAY; TIBIA & FIBULA, TWO VIE (Completed)    Fall, initial encounter        Contusion of left lower leg, initial encounter        Decreased range of motion (ROM) of left knee        Arthritis of left knee        BMI 50.0-59.9, adult ([facility] [address]              PAST MEDICAL HISTORY:   Past Medical History:   Diagnosis Date    Anemia     Arthritis     Asthma     sarcoidosis    G6PD deficiency     GERD (gastroesophageal reflux disease)     History of heart murmur in childhood     Menses, irregular     Migraine     Thyroid disease        PAST SURGICAL HISTORY:   Past Surgical History:   Procedure Laterality Date    HX APPENDECTOMY      HX GYN      c section; btl    HX OTHER SURGICAL      lung biopsy    HX OTHER SURGICAL Right 10/2020    arm       ALLERGIES:   Allergies   Allergen Reactions    Ciprofloxacin Anaphylaxis    Haldol [Haloperidol Lactate] Anaphylaxis    Percocet [Oxycodone-Acetaminophen] Anaphylaxis        CURRENT MEDICATIONS:  A list of medications prior to the time of admission include:  Prior to Admission medications    Medication Sig Start Date End Date Taking? Authorizing Provider   diclofenac EC (VOLTAREN) 75 mg EC tablet Take 1 Tablet by mouth two (2) times a day. 4/20/22   Renuka Rogers PA-C   levothyroxine (SYNTHROID) 150 mcg tablet Take 150 mcg by mouth Daily (before breakfast). Provider, Historical   liothyronine (CYTOMEL) 25 mcg tablet Take 25 mcg by mouth daily. Provider, Historical   pantoprazole (PROTONIX) 40 mg tablet Take 40 mg by mouth daily. Patient not taking: Reported on 4/20/2022    Provider, Historical   zinc sulfate (ZINC-15 PO) Take  by mouth. Provider, Historical   b complex vitamins tablet Take 1 Tablet by mouth daily. Provider, Historical   multivitamin (ONE A DAY) tablet Take 1 Tablet by mouth daily. Provider, Historical   ascorbic acid, vitamin C, (Vitamin C) 500 mg tablet Take 500 mg by mouth. Provider, Historical   cetirizine (ZYRTEC) 10 mg tablet Take 10 mg by mouth daily. Provider, Historical   diclofenac EC (VOLTAREN) 75 mg EC tablet Take 75 mg by mouth daily as needed for Pain. Patient not taking: Reported on 4/20/2022    Provider, Historical   aspirin delayed-release 81 mg tablet Take 81 mg by mouth daily. Other, MD Charleen   cholecalciferol (VITAMIN D3) 1,000 unit cap Take 1,000 Units by mouth daily. Provider, Historical   albuterol (PROVENTIL HFA, VENTOLIN HFA, PROAIR HFA) 90 mcg/actuation inhaler Take 2 Puffs by inhalation as needed for Wheezing.  Indications: sarcoidosis Provider, Historical       FAMILY HISTORY:   Family History   Problem Relation Age of Onset    Hypertension Mother     Diabetes Mother     Cancer Father     Hypertension Father     Diabetes Father     Breast Cancer Maternal Grandmother        SOCIAL HISTORY:   Social History     Socioeconomic History    Marital status: SINGLE   Tobacco Use    Smoking status: Current Every Day Smoker     Packs/day: 0.25     Years: 20.00     Pack years: 5.00    Smokeless tobacco: Never Used   Vaping Use    Vaping Use: Never used   Substance and Sexual Activity    Alcohol use: Yes     Alcohol/week: 1.0 standard drink     Types: 1 Shots of liquor per week     Comment: occasional/social    Drug use: Not Currently     Types: Marijuana     Comment: in early adulthood       ROS:No CP, No SOB, No fever/chills nor night sweats. No headaches, vision abnormalities to include double and or loss of vision. No dizziness. No hearing abnormalities. No Chest Pain nor Shortness of breath. Pt denies h/o spinal surgery, injections, or PT/chiropractor. Patient has attempted self treatment with less than adequate relief on oral and topical analgesic / anti inflammatory medications . Pt denies change in bowel or bladder habits. No saddle paresthesia / anesthesia. Pt denies fever, unplanned weight loss / weight gains, and no skin changes. Musculoskeletal pain per HPI. Pain is exacerbated positionally. PHYSICAL EXAM:    Visit Vitals  Pulse (!) 110   Temp 96.8 °F (36 °C) (Temporal)   Ht 5' 1\" (1.549 m)   Wt 290 lb (131.5 kg)   SpO2 91%   BMI 54.80 kg/m²       Constitutional: Appears well-developed and well-nourished. No distress. Sitting comfortably in the exam room, interacting with conversation with pleasant affect. Gait appears steady and patient exhibits no evidence of ataxia. Patient is able to ambulate with caution. No focal neurological deficit noted.  No facial droop, slurred speech, or evidence of altered mentation noted on exam.   Skin: Skin over the head, neck, bilateral limbs, and trunk is warm and dry. No rash or erythema noted. Cranial Nerves II-XII grossly intact  HENT: NC/AT. Normal symmetry, bulk and tone of facial and neck musculature. Trachea midline. No discernible thyromegaly or masses. No involuntary movements. Lymphatic: No preauricular, submandibuar, anterior or posterior cervical lymphadenopathy. Psychiatric: The patient is awake, alert, and oriented to person, place and time. Behavior is normal. Thought content normal.   Cardiovascular: No clubbing, cyanosis. No edema bilateral lower extremities. Pulmonary: No tripoding nor accessory muscle recruitment. Breathing normally, no distress, no audible wheezing. Distal cap refill intact at 2/2 Mir UE / LE. Neuro intact Mir UE/LE to noxious stimuli        Ortho Specific exam:    Left knee reveals skin intact. No warmth, erythema, edema, or ecchymosis. There is a trace effusion noted. With patient supine she has poor range of motion secondary to proximal posterior soft tissue bulk in the flexion plane noted to barely 70 degrees and lacking 10 degrees to full extension. There is significant medial joint line pain in the extension plane. She has a varus deformity noted in extension. Patella tracks midline with crepitation. Quad and patellar tendons intact. MCL and LCL intact with no laxity. ACL and PCL intact with no laxity. Positive Winston's sign    X-ray: [facility] 6/21/2022 space 3 view of the left knee reveals tricompartmental osteoarthritis with what appears to be a healed medial tibial plateau fracture. There is patellofemoral arthritis noted and moderate medial joint space narrowing. There is a moderate to severe collapse of the medial joint space of the left knee when compared to the right knee and AP and tunnel imaging. No lytic or blastic lesions. No soft tissue ossifications.   2 view of the left tib-fib reveals no fracture deformity lesions masses or step-offs. No soft tissue ossifications. No lytic or blastic lesions. IMPRESSION:      ICD-10-CM ICD-9-CM    1. Acute pain of left knee  M25.562 719.46 AMB POC X-RAY KNEE 3 VIEW      AMB POC XRAY; TIBIA & FIBULA, TWO VIE   2. Fall, initial encounter  [address] XXXA E888.9    3. Contusion of left lower leg, initial encounter  S80.12XA 924.10    4. Decreased range of motion (ROM) of left knee  M25.662 719.56    5. Arthritis of left knee  M17.12 716.96    6. BMI 50.0-59.9, adult [facility])  Z68.43 V85.43         PLAN: Today we discussed alternatives to care to include but not limited to a reordering of her previously requested MRI of the left knee without contrast.  For comfort Tylenol Motrin per manufactures recommended dosing. An Ace wrap is certainly fine to use but I do not believe there is any soft tissue instability in the knee as I have examined her today. Patient will follow back once the MRI is available for review and comment. I offered her a note for work covering for ambulatory tasks only. 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 Today all of her questions answered to her satisfaction copy of her x-rays reviewed and provided. We did discuss weight loss strategies again and with a BMI of 54.80 I encouraged her to continue aggressive calorie and sugar reduction as well as healthy eating meal options. Additionally today we discussed the diagnosis of obesity and the importance of weight management for both cardiovascular health. The patient was recommended to decrease carbohydrate and sugar intake. Patient recommended a formal dietary consult which they will consider and return a call to our office.   In light of the patient's osteoarthritic findings I am making a recommendation for aerobic exercise to include but not limited to stationary bicycle, elliptical, therapeutic walking with good shoes and or swimming. Patient should avoid any running or jumping. If using the treadmill then recommendation for no elevation and no running or jogging. Care plan outlined and precautions discussed. Results were reviewed with the patient. All medications were reviewed with the patient. All of pt's questions and concerns were addressed. Alarm symptoms and return precautions associated with chief complaint and evaluation were reviewed with the patient in detail. The patient demonstrated adequate understanding. The patient expresses willing compliance with the treatment plan. No Narcotic indicated today. Patient given pain medication for short term acute pain relief. Goal is to treat patient according to above plan and to ultimately have patient off all pain medications once appropriate. If chronic pain management is required beyond what is expected for current orthopedic problem, will refer patient to pain management.  was reviewed and will be reviewed with every medication refill request.         Patient provided a reminder for a \"due or due soon\" health maintenance. I have asked the patient to schedule an appointment with their primary care provider for follow-up on general health maintenance concerns. Today all the patient's questions were answered to their satisfaction. Copies of x-rays reviewed if obtained this visit, and provided to patient. Dictation disclaimer:  Please note that this dictation was completed with [employer], the [employer] voice recognition software. Quite often unanticipated grammatical, syntax, homophones, and other interpretive errors are inadvertently transcribed by the computer software. Please disregard these errors. Please excuse any errors that have escaped final proofreading. Meghna CAMARA, APC, MPAS, PA-C  [facility]

## 2022-07-18 ENCOUNTER — HOSPITAL ENCOUNTER (OUTPATIENT)
Dept: MRI IMAGING | Age: 47
Discharge: HOME | End: 2022-07-18
Attending: PHYSICIAN ASSISTANT
Payer: MEDICAID

## 2022-07-18 PROCEDURE — 73721 MRI JNT OF LWR EXTRE W/O DYE: CPT

## 2022-10-19 ENCOUNTER — TRANSCRIBE ORDER (OUTPATIENT)
Dept: SCHEDULING | Age: 47
End: 2022-10-19

## 2022-10-19 DIAGNOSIS — Z12.31 VISIT FOR SCREENING MAMMOGRAM: Primary | ICD-10-CM

## 2022-10-21 ENCOUNTER — HOSPITAL ENCOUNTER (OUTPATIENT)
Dept: MAMMOGRAPHY | Age: 47
Discharge: HOME | End: 2022-10-21
Attending: OBSTETRICS & GYNECOLOGY
Payer: MEDICAID

## 2022-10-21 DIAGNOSIS — Z12.31 VISIT FOR SCREENING MAMMOGRAM: ICD-10-CM

## 2022-10-21 PROCEDURE — 77063 BREAST TOMOSYNTHESIS BI: CPT

## 2022-11-07 RX ORDER — DICLOFENAC SODIUM 75 MG/1
TABLET, DELAYED RELEASE ORAL
Qty: 60 TABLET | Refills: 2 | Status: SHIPPED | OUTPATIENT
Start: 2022-11-07

## 2023-05-24 RX ORDER — DICLOFENAC SODIUM 75 MG/1
TABLET, DELAYED RELEASE ORAL
Qty: 60 TABLET | Refills: 0 | Status: SHIPPED | OUTPATIENT
Start: 2023-05-24

## 2023-07-28 ENCOUNTER — TELEPHONE (OUTPATIENT)
Age: 48
End: 2023-07-28

## 2023-07-28 RX ORDER — DICLOFENAC SODIUM 75 MG/1
75 TABLET, DELAYED RELEASE ORAL 2 TIMES DAILY
Qty: 60 TABLET | Refills: 3 | Status: SHIPPED | OUTPATIENT
Start: 2023-07-28

## 2023-07-28 NOTE — TELEPHONE ENCOUNTER
Patient called and is asking for a refill on the Diclofenac 75 mg EC Tablet from [facility]. [employer] on [address]. [phone]. Patient tel. [phone]. Note: patient last seen for the left knee.

## 2023-10-12 ENCOUNTER — TRANSCRIBE ORDERS (OUTPATIENT)
Facility: HOSPITAL | Age: 48
End: 2023-10-12

## 2023-10-12 DIAGNOSIS — Z12.31 VISIT FOR SCREENING MAMMOGRAM: Primary | ICD-10-CM

## 2023-10-26 ENCOUNTER — HOSPITAL ENCOUNTER (OUTPATIENT)
Facility: HOSPITAL | Age: 48
Discharge: HOME | End: 2023-10-26
Attending: OBSTETRICS & GYNECOLOGY
Payer: MEDICAID

## 2023-10-26 VITALS — HEIGHT: 61 IN | WEIGHT: 293 LBS | BODY MASS INDEX: 55.32 KG/M2

## 2023-10-26 DIAGNOSIS — Z12.31 VISIT FOR SCREENING MAMMOGRAM: ICD-10-CM

## 2023-10-26 PROCEDURE — 77063 BREAST TOMOSYNTHESIS BI: CPT

## 2024-09-26 ENCOUNTER — TRANSCRIBE ORDERS (OUTPATIENT)
Facility: HOSPITAL | Age: 49
End: 2024-09-26

## 2024-09-26 DIAGNOSIS — Z12.31 SCREENING MAMMOGRAM FOR HIGH-RISK PATIENT: Primary | ICD-10-CM

## 2024-11-13 ENCOUNTER — HOSPITAL ENCOUNTER (OUTPATIENT)
Facility: HOSPITAL | Age: 49
Discharge: HOME | End: 2024-11-16
Attending: OBSTETRICS & GYNECOLOGY
Payer: MEDICAID

## 2024-11-13 VITALS — BODY MASS INDEX: 54.75 KG/M2 | HEIGHT: 61 IN | WEIGHT: 290 LBS

## 2024-11-13 DIAGNOSIS — Z12.31 ENCOUNTER FOR SCREENING MAMMOGRAM FOR HIGH-RISK PATIENT: ICD-10-CM

## 2024-11-13 PROCEDURE — 77063 BREAST TOMOSYNTHESIS BI: CPT
